# Patient Record
Sex: FEMALE | Race: WHITE | NOT HISPANIC OR LATINO | Employment: UNEMPLOYED | URBAN - METROPOLITAN AREA
[De-identification: names, ages, dates, MRNs, and addresses within clinical notes are randomized per-mention and may not be internally consistent; named-entity substitution may affect disease eponyms.]

---

## 2021-08-04 ENCOUNTER — OFFICE VISIT (OUTPATIENT)
Dept: URGENT CARE | Facility: CLINIC | Age: 26
End: 2021-08-04

## 2021-08-04 VITALS
TEMPERATURE: 98.2 F | DIASTOLIC BLOOD PRESSURE: 72 MMHG | WEIGHT: 142 LBS | RESPIRATION RATE: 16 BRPM | SYSTOLIC BLOOD PRESSURE: 116 MMHG | OXYGEN SATURATION: 96 % | HEART RATE: 78 BPM

## 2021-08-04 DIAGNOSIS — T16.2XXA FOREIGN BODY OF LEFT EAR, INITIAL ENCOUNTER: ICD-10-CM

## 2021-08-04 PROCEDURE — 69200 CLEAR OUTER EAR CANAL: CPT | Performed by: PHYSICIAN ASSISTANT

## 2021-08-04 ASSESSMENT — ENCOUNTER SYMPTOMS
MYALGIAS: 0
FEVER: 0
VERTIGO: 0
HEADACHES: 0
NAUSEA: 0
DIZZINESS: 0
PALPITATIONS: 0
CHILLS: 0
FATIGUE: 0

## 2021-08-04 NOTE — PROGRESS NOTES
Subjective:   Erna Peace is a 25 y.o. female who presents for Foreign Body in Ear (foreign body in (L) ear x last night)      Foreign Body in Ear  This is a new (Silicone Q-tip head stuck in left ear x1 day.) problem. The current episode started yesterday. The problem has been unchanged. Pertinent negatives include no chest pain, chills, congestion, fatigue, fever, headaches, myalgias, nausea, rash or vertigo. Nothing aggravates the symptoms. She has tried nothing for the symptoms.       Review of Systems   Constitutional: Negative for chills, fatigue, fever and malaise/fatigue.   HENT: Positive for ear pain. Negative for congestion, ear discharge, hearing loss and tinnitus.         Foreign body left ear   Cardiovascular: Negative for chest pain and palpitations.   Gastrointestinal: Negative for nausea.   Musculoskeletal: Negative for myalgias.   Skin: Negative for rash.   Neurological: Negative for dizziness, vertigo and headaches.       Medications:    • This patient does not have an active medication from one of the medication groupers.    Allergies: Patient has no known allergies.    Problem List: Erna Peace does not have a problem list on file.    Surgical History:  No past surgical history on file.    Past Social Hx: Erna Peace  reports that she has never smoked. She has never used smokeless tobacco.     Past Family Hx:  Erna Peace family history is not on file.     Problem list, medications, and allergies reviewed by myself today in Epic.     Objective:     /72 (BP Location: Left arm, Patient Position: Sitting, BP Cuff Size: Adult)   Pulse 78   Temp 36.8 °C (98.2 °F) (Temporal)   Resp 16   Wt 64.4 kg (142 lb)   SpO2 96%     Physical Exam  Constitutional:       General: She is not in acute distress.     Appearance: Normal appearance. She is not ill-appearing, toxic-appearing or diaphoretic.   HENT:      Head: Normocephalic and atraumatic.      Right Ear: Tympanic membrane, ear canal and external ear  normal.      Left Ear: Tympanic membrane and ear canal normal.      Ears:      Comments: Foreign body visualized in left auditory canal.  Approximately 0.5 cm silicone Q-tip head.  No drainage, bleeding or discharge  Eyes:      Conjunctiva/sclera: Conjunctivae normal.   Cardiovascular:      Rate and Rhythm: Normal rate and regular rhythm.      Pulses: Normal pulses.      Heart sounds: Normal heart sounds.   Pulmonary:      Effort: Pulmonary effort is normal.      Breath sounds: Normal breath sounds.   Musculoskeletal:      Cervical back: Normal range of motion.   Neurological:      Mental Status: She is alert.           Assessment/Plan:     Comments/MDM:     • Foreign body was visualized in clinic.  Removal was done using alligator forceps.  Patient tolerated this well with no complications.  Q-tip pad was removed.  Ear canal patent.  No bleeding, discharge or drainage.  TM pearly gray without perforation.  Symptoms fully improved.  Call with any questions or concerns.     Diagnosis and associated orders:     1. Foreign body of left ear, initial encounter                Differential diagnosis, natural history, supportive care, and indications for immediate follow-up discussed.    Advised the patient to follow-up with the primary care physician for recheck, reevaluation, and consideration of further management.    Please note that this dictation was created using voice recognition software. I have made reasonable attempt to correct obvious errors, but I expect that there are errors of grammar and possibly content that I did not discover before finalizing the note.    This note was electronically signed by NILTON Hampton PA-C